# Patient Record
Sex: FEMALE | Race: WHITE
[De-identification: names, ages, dates, MRNs, and addresses within clinical notes are randomized per-mention and may not be internally consistent; named-entity substitution may affect disease eponyms.]

---

## 2017-12-01 NOTE — CR
Right tibia and fibula: AP and lateral views of the right tibia and 

fibula were obtained.

 

No fracture or other abnormality is seen.

 

Impression:

1.  No abnormality is identified on two-view right tibia and fibula 

study.

 

Diagnostic code #1
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
## 2017-12-01 NOTE — CR
Right hand:  Four views of the right hand were obtained.

 

Comparison: No prior hand exam.

 

Joint spaces are preserved.  No fracture, dislocation or other bony 

abnormality is seen.

 

Impression:

1.  No abnormality is identified on right hand exam.

 

Diagnostic code #1

## 2019-08-26 ENCOUNTER — HOSPITAL ENCOUNTER (EMERGENCY)
Dept: HOSPITAL 41 - JD.ED | Age: 29
Discharge: HOME | End: 2019-08-26
Payer: COMMERCIAL

## 2019-08-26 DIAGNOSIS — Z88.5: ICD-10-CM

## 2019-08-26 DIAGNOSIS — E03.9: ICD-10-CM

## 2019-08-26 DIAGNOSIS — M79.89: Primary | ICD-10-CM

## 2019-08-26 DIAGNOSIS — E11.9: ICD-10-CM

## 2019-08-26 DIAGNOSIS — Z88.8: ICD-10-CM

## 2019-08-26 DIAGNOSIS — E66.01: ICD-10-CM

## 2019-08-26 DIAGNOSIS — J45.909: ICD-10-CM

## 2019-08-26 DIAGNOSIS — Z79.899: ICD-10-CM

## 2019-08-26 DIAGNOSIS — Z79.51: ICD-10-CM

## 2019-08-26 DIAGNOSIS — M79.662: ICD-10-CM

## 2019-08-26 NOTE — US
Left lower extremity deep venous ultrasound: Duplex and color Doppler 

imaging was obtained of the left common femoral, proximal greater 

saphenous, superficial femoral, popliteal, posterior tibial and 

peroneal veins.  Right common femoral vein was also evaluated.

 

Comparison: No prior venous imaging.

 

Findings: Normal phasic flow, augmentation and compression are seen 

within the left lower extremity and within the right common femoral 

vein.

 

Impression:

1.  No evidence of deep venous thrombosis within the left lower 

extremity or within the right common femoral vein.

 

Diagnostic code #1

## 2019-08-26 NOTE — EDM.PDOC
ED HPI GENERAL MEDICAL PROBLEM





- General


Chief Complaint: Lower Extremity Injury/Pain


Stated Complaint: LEG PAIN WITH REDNESS AND SWELLING


Time Seen by Provider: 08/26/19 07:09





- History of Present Illness


INITIAL COMMENTS - FREE TEXT/NARRATIVE: 





29-year-old female presents emergency room with left leg pain redness and 

swelling.





This started 2 days ago in her lower calf and is extended up above her knee 

now. She has a history to prior DVTs. She denies any chest pain breathing 

difficulties or shortness of breath. She developed her first DVT when she was 

in the hospital having a brain tumor removed. Patient is currently treated for 

asthma and this is under good control at this time. Patient's last surgery was 

in 2013. She is currently on Keppra but she does not recall having seizures in 

the past.








  ** Left Leg


Pain Score (Numeric/FACES): 7





- Related Data


 Allergies











Allergy/AdvReac Type Severity Reaction Status Date / Time


 


acetaminophen [From Percocet] AdvReac  Dizziness Verified 12/01/17 06:23


 


oxycodone [From Percocet] AdvReac  Dizziness Verified 12/01/17 06:23











Home Meds: 


 Home Meds





Albuterol Sulfate [Proair Hfa] 8.5 gm IH ASDIRECTED PRN 12/01/17 [History]


Desmopressin Acetate [Ddavp] 5 mcg ANDRADE BID 12/01/17 [History]


Levothyroxine Sodium [Synthroid] 150 mcg PO ACBREAKFAST 12/01/17 [History]


Phentermine HCl 30 mg PO DAILY 12/01/17 [History]


Topiramate [Topiramate ER] 250 cap PO BID 12/01/17 [History]


levETIRAcetam [Keppra] 1,000 mg PO DAILY 12/01/17 [History]


predniSONE [Prednisone] 5 mg PO DAILY 12/01/17 [History]


cephALEXin [Keflex] 500 mg PO Q6H #28 cap 08/26/19 [Rx]











Past Medical History


Cardiovascular History: Reports: Blood Clots/VTE/DVT, Other (See Below)


Other Cardiovascular History: hypotension


Respiratory History: Reports: Asthma


Psychiatric History: Reports: Depression


Endocrine/Metabolic History: Reports: Hypothyroidism, Other (See Below)


Other Endocrine/Metabolic History: Diabetes insipidus, adrenal insufficiency


Oncologic (Cancer) History: Reports: Brain





- Infectious Disease History


Infectious Disease History: Reports: Chicken Pox





- Past Surgical History


Head Surgeries/Procedures: Reports: Shunt


Endocrine Surgical History: Reports: Other (See Below)


Other Endocrine Surgeries/Procedures: shunt in placed from brain.





Social & Family History





- Family History


Family Medical History: Noncontributory





- Tobacco Use


Smoking Status *Q: Never Smoker





- Caffeine Use


Caffeine Use: Reports: None





- Recreational Drug Use


Recreational Drug Use: No





Review of Systems





- Review of Systems


Review Of Systems: See Below


Constitutional: Reports: No Symptoms


Respiratory: Reports: No Symptoms


Cardiovascular: Reports: No Symptoms


GI/Abdominal: Reports: No Symptoms


Genitourinary: Reports: No Symptoms


Musculoskeletal: Reports: No Symptoms


Skin: Reports: Rash (In the affected leg)


Neurological: Reports: No Symptoms


Psychiatric: Reports: No Symptoms





ED EXAM, GENERAL





- Physical Exam


Exam: See Below


Exam Limited By: No Limitations


General Appearance: Alert, No Apparent Distress


Respiratory/Chest: No Respiratory Distress, Lungs Clear, Normal Breath Sounds


GI/Abdominal: Normal Bowel Sounds, Soft, Non-Tender, Other (Patient is morbidly 

obese)


Back Exam: No: CVA Tenderness (L), CVA Tenderness (R)


Extremities: Other (She has increased swelling the left leg she has some 

blotchy redness on the medial aspect starting in the mid calf working up to the 

mid thigh.)


Skin Exam: Warm, Dry, Intact


Lymphatic: No Adenopathy





Course





- Vital Signs


Last Recorded V/S: 


 Last Vital Signs











Temp  36.0 C   08/26/19 06:35


 


Pulse  102 H  08/26/19 06:35


 


Resp  16   08/26/19 06:35


 


BP  138/77   08/26/19 06:35


 


Pulse Ox  100   08/26/19 06:35














- Re-Assessments/Exams


Free Text/Narrative Re-Assessment/Exam: 





08/26/19 09:32


Doppler ultrasound of the venous system in the left leg is negative for DVT. I 

discussed the findings this with the patient patient be started on Keflex use 

warm moist heat to the area every couple hours while awake and will follow up 

with her primary physician in [place] in the next day or 2 for recheck.





Departure





- Departure


Time of Disposition: 09:33


Disposition: Home, Self-Care 01


Clinical Impression: 


 Pain and swelling of left lower extremity








- Discharge Information


Prescriptions: 


cephALEXin [Keflex] 500 mg PO Q6H #28 cap


Referrals: 


Mees,Sara Lynn, MD [Primary Care Provider] - 


Forms:  ED Department Discharge, ED Return to Work/School Form


Additional Instructions: 


Return to the emergency room with any questions problems or worsening symptoms. 





Follow-up with your regular provider in the next day or 2 for recheck. 

Occasionally we can get false-negative ultrasounds and this needs to be 

followed closely. 





You will be started on Keflex which is an antibiotic in case this represents an 

early cellulitis which is an infection involving the skin. 





Use warm moist heat over the area every couple hours while you are awake.

## 2025-05-31 ENCOUNTER — HOSPITAL ENCOUNTER (INPATIENT)
Dept: HOSPITAL 41 - JD.ED | Age: 35
LOS: 7 days | Discharge: HOME | DRG: 49 | End: 2025-06-07
Payer: COMMERCIAL

## 2025-05-31 DIAGNOSIS — Z96.89: ICD-10-CM

## 2025-05-31 DIAGNOSIS — J45.909: ICD-10-CM

## 2025-05-31 DIAGNOSIS — Z88.8: ICD-10-CM

## 2025-05-31 DIAGNOSIS — G92.8: ICD-10-CM

## 2025-05-31 DIAGNOSIS — E89.3: ICD-10-CM

## 2025-05-31 DIAGNOSIS — E87.6: ICD-10-CM

## 2025-05-31 DIAGNOSIS — E27.2: ICD-10-CM

## 2025-05-31 DIAGNOSIS — Z79.52: ICD-10-CM

## 2025-05-31 DIAGNOSIS — Z79.51: ICD-10-CM

## 2025-05-31 DIAGNOSIS — R00.1: ICD-10-CM

## 2025-05-31 DIAGNOSIS — E03.9: ICD-10-CM

## 2025-05-31 DIAGNOSIS — Z79.899: ICD-10-CM

## 2025-05-31 DIAGNOSIS — E87.0: ICD-10-CM

## 2025-05-31 DIAGNOSIS — G00.9: Primary | ICD-10-CM

## 2025-05-31 LAB
ALBUMIN SERPL-MCNC: 3.4 G/DL (ref 3.4–5)
ALBUMIN/GLOB SERPL: 0.9 {RATIO} (ref 1–2)
ALP SERPL-CCNC: 171 U/L (ref 46–116)
ALT SERPL-CCNC: 56 U/L (ref 14–59)
AMM URATE CRY #/AREA URNS HPF: (no result) /HPF
AMORPH SED URNS QL MICRO: (no result) /HPF
AMPHET UR QL SCN: (no result)
AMPHET UR QL SCN: NEGATIVE
ANION GAP SERPL CALC-SCNC: 16.3 MMOL/L (ref 5–15)
APPEARANCE UR: CLEAR
APTT PPP: 25.1 SECONDS (ref 21.7–31.4)
AST SERPL-CCNC: 31 U/L (ref 15–37)
BACTERIA URNS QL MICRO: (no result) /HPF
BARBITURATES UR QL SCN: NEGATIVE
BASOPHILS # BLD AUTO: 0.1 K/MM3 (ref 0–0.2)
BASOPHILS NFR BLD AUTO: 0.9 % (ref 0–1)
BENZODIAZ UR QL SCN: NEGATIVE
BILIRUB SERPL-MCNC: 0.5 MG/DL (ref 0.2–1)
BILIRUB UR STRIP-MCNC: NEGATIVE MG/DL
BROAD CASTS #/AREA URNS LPF: (no result) /HPF (ref 0–5)
BUN SERPL-MCNC: 13 MG/DL (ref 7–18)
BUN/CREAT SERPL: 10 (ref 14–18)
BUPRENORPHINE UR QL: NEGATIVE
CA CARBONATE CRY #/AREA URNS HPF: (no result) /[HPF]
CA PHOS CRY #/AREA URNS HPF: (no result) /[HPF]
CALCIUM SERPL-MCNC: 9.6 MG/DL (ref 8.5–10.1)
CAOX CRY #/AREA URNS HPF: (no result) /[HPF]
CHLORIDE SERPL-SCNC: 112 MEQ/L (ref 98–107)
CO2 SERPL-SCNC: 23 MEQ/L (ref 21–32)
COARSE GRAN CASTS #/AREA URNS LPF: (no result) /HPF (ref 0–5)
COLOR UR: YELLOW
CREAT CL 24H UR+SERPL-VRATE: 43.8 ML/MIN
CREAT SERPL-MCNC: 1.3 MG/DL (ref 0.55–1.02)
CRYSTALS #/AREA URNS HPF: (no result) /HPF
CYSTINE CRY #/AREA URNS HPF: (no result) /[HPF]
EGFRCR SERPLBLD CKD-EPI 2021: 55 ML/MIN (ref 60–?)
EOSINOPHIL # BLD AUTO: 1.1 K/MM3 (ref 0–0.4)
EOSINOPHIL NFR BLD AUTO: 12 % (ref 0–6)
EPI CELLS #/AREA URNS HPF: (no result) /HPF (ref 0–5)
EPITH CASTS URNS QL MICRO: (no result) /HPF (ref 0–5)
ETHANOL BLD-MCNC: 0 GM%
FATTY CASTS #/AREA UR COMP ASSIST: (no result) /HPF (ref 0–5)
FINE GRAN CASTS #/AREA URNS LPF: (no result) /LPF (ref 0–5)
GLOBULIN SER-MCNC: 3.7 GM/DL
GLUCOSE SERPL-MCNC: 81 MG/DL (ref 70–99)
GLUCOSE UR STRIP-MCNC: NEGATIVE MG/DL
HCG SERPL-ACNC: < 1 MIU/ML
HCT VFR BLD AUTO: 49.6 % (ref 37–47)
HGB BLD-MCNC: 15.9 GM/DL (ref 12–16)
HYALINE CASTS #/AREA URNS LPF: (no result) /LPF (ref 0–5)
IMM GRANULOCYTES # BLD: 0.04 K/MM3 (ref 0–0.05)
IMM GRANULOCYTES NFR BLD: 0.4 % (ref 0–0.4)
INR PPP: 1.03
KETONES UR STRIP-MCNC: (no result) MG/DL
LEUCINE CRY #/AREA URNS HPF: (no result) /[HPF]
LEUKOCYTE ESTERASE UR QL STRIP: NEGATIVE
LYMPHOCYTES # BLD AUTO: 1.3 K/MM3 (ref 1–4.8)
LYMPHOCYTES NFR BLD AUTO: 14.6 % (ref 24–44)
MCH RBC QN AUTO: 27.3 PG (ref 28–32)
MCHC RBC AUTO-ENTMCNC: 32.1 G/DL (ref 32–36)
MCHC RBC AUTO-ENTMCNC: 85.1 FL (ref 83–99)
METHADONE UR QL SCN: NEGATIVE
MONOCYTES # BLD AUTO: 0.6 K/MM3 (ref 0–0.8)
MONOCYTES NFR BLD AUTO: 7 % (ref 0–8)
MUCOUS THREADS URNS QL MICRO: (no result) /HPF
NEUTROPHILS # BLD AUTO: 5.9 K/MM3 (ref 1.8–7.7)
NEUTROPHILS NFR BLD AUTO: 65.1 % (ref 41–71)
NITRITE UR QL: NEGATIVE
NRBC BLD AUTO-RTO: 0 % (ref 0–0.02)
NRBC BLD AUTO-RTO: 0 % (ref 0–0.2)
OTHER ELEMENTS URNS MICRO: (no result)
OVAL FAT BODIES #/AREA URNS HPF: (no result) /[HPF] (ref 0–5)
OXYCODONE UR QL SCN: NEGATIVE
PATH REV BLD -IMP: (no result)
PH UR STRIP: 6 [PH] (ref 5–8)
PLATELET # BLD AUTO: 254 K/MM3 (ref 150–400)
PMV BLD AUTO: 9.6 FL (ref 9.4–12.3)
POTASSIUM SERPL-SCNC: 4.3 MEQ/L (ref 3.5–5.1)
PROT SERPL-MCNC: 7.1 G/DL (ref 6.4–8.2)
PROT UR STRIP-MCNC: NEGATIVE MG/DL
PROTHROMBIN TIME: 10.9 SECONDS (ref 9.7–12)
RBC # BLD AUTO: 5.83 M/MM3 (ref 4.1–5.3)
RBC # URNS HPF: (no result) /HPF (ref 0–5)
RBC CASTS #/AREA URNS HPF: (no result) /LPF (ref 0–5)
RBC UR QL: NEGATIVE
RENAL EPI CELLS #/AREA URNS HPF: (no result) /HPF (ref 0–5)
SODIUM SERPL-SCNC: 147 MEQ/L (ref 136–145)
SP GR UR STRIP: 1.01 (ref 1–1.03)
SQUAMOUS #/AREA URNS HPF: (no result) /HPF (ref 0–5)
THC UR QL SCN>20 NG/ML: NEGATIVE
TRI-PHOS CRY #/AREA URNS HPF: (no result) /HPF
TRICHOMONAS UR QL MICRO: (no result)
TROPONIN I SERPL HS-IMP: 15 PG/ML (ref ?–51)
TYROSINE CRYSTALS [#/AREA] IN URINE SEDIMENT BY MICROSCOPY HIGH POWER FIELD: (no result) /[HPF]
URATE CRY #/AREA URNS HPF: (no result) /[HPF]
UROBILINOGEN UR STRIP-ACNC: 0.2 (ref 0.2–1)
WAXY CASTS #/AREA URNS HPF: (no result) /LPF (ref 0–5)
WBC # BLD AUTO: 9.11 K/MM3 (ref 3.9–11.3)
WBC CASTS #/AREA URNS HPF: (no result) /LPF (ref 0–5)
WBC CLUMPS #/AREA URNS HPF: (no result) /HPF
WBC UR QL: (no result) /HPF (ref 0–5)
YEAST BUDDING #/AREA URNS HPF: (no result) /[HPF]
YEAST HYPHAE URNS QL MICRO: (no result)
YEAST URNS QL MICRO: (no result)

## 2025-05-31 PROCEDURE — A9577 INJ MULTIHANCE: HCPCS

## 2025-05-31 PROCEDURE — C1758 CATHETER, URETERAL: HCPCS

## 2025-05-31 RX ADMIN — LORAZEPAM ONE: 2 INJECTION INTRAMUSCULAR; INTRAVENOUS at 18:56

## 2025-05-31 RX ADMIN — LORAZEPAM ONE MG: 2 INJECTION INTRAMUSCULAR; INTRAVENOUS at 18:14

## 2025-05-31 RX ADMIN — LORAZEPAM ONE: 2 INJECTION INTRAMUSCULAR; INTRAVENOUS at 19:13

## 2025-05-31 RX ADMIN — METHYLPREDNISOLONE SODIUM SUCCINATE ONE MG: 125 INJECTION, POWDER, FOR SOLUTION INTRAMUSCULAR; INTRAVENOUS at 19:23

## 2025-05-31 RX ADMIN — OLANZAPINE ONE: 10 INJECTION, POWDER, FOR SOLUTION INTRAMUSCULAR at 19:14

## 2025-05-31 RX ADMIN — OLANZAPINE ONE MG: 10 INJECTION, POWDER, FOR SOLUTION INTRAMUSCULAR at 18:41

## 2025-05-31 RX ADMIN — IOPAMIDOL ONE: 755 INJECTION, SOLUTION INTRAVENOUS at 22:21

## 2025-05-31 RX ADMIN — LORAZEPAM PRN MG: 2 INJECTION INTRAMUSCULAR; INTRAVENOUS at 21:25

## 2025-05-31 RX ADMIN — SODIUM CHLORIDE SCH MLS/HR: 0.9 INJECTION, SOLUTION INTRAVENOUS at 20:02

## 2025-05-31 RX ADMIN — SODIUM CHLORIDE ONE MLS/HR: 0.9 INJECTION, SOLUTION INTRAVENOUS at 17:30

## 2025-05-31 RX ADMIN — Medication PRN ML: at 17:39

## 2025-06-01 LAB
ALBUMIN SERPL-MCNC: 3.4 G/DL (ref 3.4–5)
ALBUMIN/GLOB SERPL: 0.8 {RATIO} (ref 1–2)
ALP SERPL-CCNC: 170 U/L (ref 46–116)
ALT SERPL-CCNC: 54 U/L (ref 14–59)
ANION GAP SERPL CALC-SCNC: 20.1 MMOL/L (ref 5–15)
APPEARANCE CSF: CLEAR
APPEARANCE SPUN CSF: (no result)
AST SERPL-CCNC: 36 U/L (ref 15–37)
BASE EXCESS BLDA CALC-SCNC: -4.4 MMOL/L (ref -2–2)
BASOPHILS # BLD AUTO: 0 K/MM3 (ref 0–0.2)
BASOPHILS NFR BLD AUTO: 0.3 % (ref 0–1)
BILIRUB SERPL-MCNC: 0.8 MG/DL (ref 0.2–1)
BUN SERPL-MCNC: 16 MG/DL (ref 7–18)
BUN/CREAT SERPL: 10.7 (ref 14–18)
CALCIUM SERPL-MCNC: 9.7 MG/DL (ref 8.5–10.1)
CHLORIDE SERPL-SCNC: 122 MEQ/L (ref 98–107)
CO2 SERPL-SCNC: 21 MEQ/L (ref 21–32)
COLOR CSF: COLORLESS
CREAT CL 24H UR+SERPL-VRATE: 37.96 ML/MIN
CREAT SERPL-MCNC: 1.5 MG/DL (ref 0.55–1.02)
CRP SERPL-MCNC: 14.43 MG/DL (ref ?–0.3)
EGFRCR SERPLBLD CKD-EPI 2021: 47 ML/MIN (ref 60–?)
EOSINOPHIL # BLD AUTO: 0 K/MM3 (ref 0–0.4)
EOSINOPHIL # CSF: (no result) 10*3/UL
EOSINOPHIL NFR BLD AUTO: 0 % (ref 0–6)
GLOBULIN SER-MCNC: 4.2 GM/DL
GLUCOSE SERPL-MCNC: 117 MG/DL (ref 70–99)
HCO3 BLDA-SCNC: 17.4 MEQ/L (ref 22–26)
HCT VFR BLD AUTO: 51.2 % (ref 37–47)
HGB BLD-MCNC: 16.6 GM/DL (ref 12–16)
IMM GRANULOCYTES # BLD: 0.04 K/MM3 (ref 0–0.05)
IMM GRANULOCYTES NFR BLD: 0.5 % (ref 0–0.4)
LYMPHOCYTES # BLD AUTO: 1 K/MM3 (ref 1–4.8)
LYMPHOCYTES # CSF: (no result) 10*3/UL (ref 0–8)
LYMPHOCYTES NFR BLD AUTO: 12.9 % (ref 24–44)
Lab: (no result)
MCH RBC QN AUTO: 27.4 PG (ref 28–32)
MCHC RBC AUTO-ENTMCNC: 32.4 G/DL (ref 32–36)
MCHC RBC AUTO-ENTMCNC: 84.5 FL (ref 83–99)
MONOCYTES # BLD AUTO: 0.1 K/MM3 (ref 0–0.8)
MONOCYTES # CSF: (no result) 10*3/UL (ref 0–0)
MONOCYTES NFR BLD AUTO: 0.8 % (ref 0–8)
MONOCYTES NFR FLD MANUAL: 91.6 % (ref 70–90)
MONOCYTES+MACROPHAGES [#/VOLUME] IN CEREBRAL SPINAL FLUID: (no result) 10*3/UL (ref 0–5)
NEUTROPHILS # BLD AUTO: 6.7 K/MM3 (ref 1.8–7.7)
NEUTROPHILS NFR BLD AUTO: 85.5 % (ref 41–71)
NEUTS SEG NFR FLD MANUAL: 8.4 % (ref 2–4)
NRBC BLD AUTO-RTO: 0 % (ref 0–0.02)
NRBC BLD AUTO-RTO: 0 % (ref 0–0.2)
PATH REV BLD -IMP: (no result)
PCO2 BLDA: 25 MMHG (ref 35–45)
PLATELET # BLD AUTO: 291 K/MM3 (ref 150–400)
PMV BLD AUTO: 9.9 FL (ref 9.4–12.3)
PO2 BLDA: 74 MMHG (ref 80–100)
POTASSIUM SERPL-SCNC: 4.1 MEQ/L (ref 3.5–5.1)
PROT SERPL-MCNC: 7.6 G/DL (ref 6.4–8.2)
RBC # BLD AUTO: 6.06 M/MM3 (ref 4.1–5.3)
RBC # CSF: 302 CELLS/UL (ref 0–0)
RESP RATE: 38 /MIN
SAO2 % BLDA: 97.7 % (ref 96–97)
SEGMENTED NEUTROPHILS [#/VOLUME] IN CEREBRAL SPINAL FLUID: (no result) 10*3/UL (ref 0–5)
SODIUM SERPL-SCNC: 159 MEQ/L (ref 136–145)
SPECIMEN VOL CSF: 17.5 ML
T4 FREE SERPL-MCNC: 1.14 NG/DL (ref 0.76–1.46)
TSH SERPL DL<=0.005 MIU/L-ACNC: < 0.007 UIU/ML (ref 0.36–3.74)
TUBE # CSF: 2
VDRL CSF-TITR: (no result) {TITER}
WBC # BLD AUTO: 7.78 K/MM3 (ref 3.9–11.3)
WBC # CSF: 565 CELLS/UL (ref 0–5)

## 2025-06-01 PROCEDURE — 009U3ZX DRAINAGE OF SPINAL CANAL, PERCUTANEOUS APPROACH, DIAGNOSTIC: ICD-10-PCS

## 2025-06-01 RX ADMIN — LIDOCAINE HYDROCHLORIDE ONE ML: 10 INJECTION, SOLUTION INFILTRATION; PERINEURAL at 14:14

## 2025-06-01 RX ADMIN — MOMETASONE FUROATE AND FORMOTEROL FUMARATE DIHYDRATE SCH: 200; 5 AEROSOL RESPIRATORY (INHALATION) at 12:28

## 2025-06-01 RX ADMIN — DEXMEDETOMIDINE HYDROCHLORIDE IN 0.9% SODIUM CHLORIDE SCH MLS/HR: 4 INJECTION INTRAVENOUS at 11:35

## 2025-06-01 RX ADMIN — DEXAMETHASONE SODIUM PHOSPHATE SCH MG: 4 INJECTION, SOLUTION INTRAMUSCULAR; INTRAVENOUS at 15:34

## 2025-06-01 RX ADMIN — SODIUM CHLORIDE ONE MLS/HR: 9 INJECTION, SOLUTION INTRAVENOUS at 01:17

## 2025-06-01 RX ADMIN — SODIUM CHLORIDE SCH MLS/HR: 9 INJECTION, SOLUTION INTRAVENOUS at 15:36

## 2025-06-01 RX ADMIN — MIDAZOLAM HYDROCHLORIDE ONE MG: 1 INJECTION, SOLUTION INTRAMUSCULAR; INTRAVENOUS at 12:36

## 2025-06-01 RX ADMIN — VANCOMYCIN SCH MLS/HR: 1.75 INJECTION, SOLUTION INTRAVENOUS at 23:41

## 2025-06-01 RX ADMIN — SODIUM CHLORIDE SCH GM: 9 INJECTION, SOLUTION INTRAVENOUS at 14:10

## 2025-06-01 RX ADMIN — SODIUM CHLORIDE SCH MLS/HR: 0.45 INJECTION, SOLUTION INTRAVENOUS at 01:21

## 2025-06-01 RX ADMIN — SODIUM CHLORIDE SCH MLS/HR: 9 INJECTION, SOLUTION INTRAVENOUS at 06:00

## 2025-06-01 RX ADMIN — SODIUM CHLORIDE SCH MLS/HR: 9 INJECTION, SOLUTION INTRAVENOUS at 19:56

## 2025-06-01 RX ADMIN — MIDAZOLAM HYDROCHLORIDE ONE MG: 1 INJECTION, SOLUTION INTRAMUSCULAR; INTRAVENOUS at 11:35

## 2025-06-01 RX ADMIN — VANCOMYCIN ONE MLS/HR: 2 INJECTION, SOLUTION INTRAVENOUS at 14:10

## 2025-06-01 RX ADMIN — DEXTROSE SCH MLS/HR: 5 SOLUTION INTRAVENOUS at 11:58

## 2025-06-01 RX ADMIN — MIDAZOLAM HYDROCHLORIDE ONE: 1 INJECTION, SOLUTION INTRAMUSCULAR; INTRAVENOUS at 14:04

## 2025-06-01 RX ADMIN — DESMOPRESSIN ACETATE SCH MLS/HR: 4 INJECTION INTRAVENOUS; SUBCUTANEOUS at 15:33

## 2025-06-01 RX ADMIN — IBUPROFEN PRN MG: 400 TABLET ORAL at 00:09

## 2025-06-02 LAB
AMM URATE CRY #/AREA URNS HPF: (no result) /HPF
AMORPH SED URNS QL MICRO: (no result) /HPF
ANION GAP SERPL CALC-SCNC: 16.8 MMOL/L (ref 5–15)
APPEARANCE UR: (no result)
BACTERIA URNS QL MICRO: (no result) /HPF
BASOPHILS # BLD AUTO: 0 K/MM3 (ref 0–0.2)
BASOPHILS NFR BLD AUTO: 0.1 % (ref 0–1)
BILIRUB UR STRIP-MCNC: NEGATIVE MG/DL
BROAD CASTS #/AREA URNS LPF: (no result) /HPF (ref 0–5)
BUN SERPL-MCNC: 16 MG/DL (ref 7–18)
BUN/CREAT SERPL: 12.3 (ref 14–18)
CA CARBONATE CRY #/AREA URNS HPF: (no result) /[HPF]
CA PHOS CRY #/AREA URNS HPF: (no result) /[HPF]
CALCIUM SERPL-MCNC: 8.5 MG/DL (ref 8.5–10.1)
CAOX CRY #/AREA URNS HPF: (no result) /[HPF]
CHLORIDE SERPL-SCNC: 110 MEQ/L (ref 98–107)
CO2 SERPL-SCNC: 20 MEQ/L (ref 21–32)
COARSE GRAN CASTS #/AREA URNS LPF: (no result) /HPF (ref 0–5)
COLOR UR: (no result)
CREAT CL 24H UR+SERPL-VRATE: 43.8 ML/MIN
CREAT SERPL-MCNC: 1.3 MG/DL (ref 0.55–1.02)
CRYSTALS #/AREA URNS HPF: (no result) /HPF
CYSTINE CRY #/AREA URNS HPF: (no result) /[HPF]
EGFRCR SERPLBLD CKD-EPI 2021: 55 ML/MIN (ref 60–?)
EOSINOPHIL # BLD AUTO: 0 K/MM3 (ref 0–0.4)
EOSINOPHIL NFR BLD AUTO: 0.1 % (ref 0–6)
EPI CELLS #/AREA URNS HPF: (no result) /HPF (ref 0–5)
EPITH CASTS URNS QL MICRO: (no result) /HPF (ref 0–5)
FATTY CASTS #/AREA UR COMP ASSIST: (no result) /HPF (ref 0–5)
FINE GRAN CASTS #/AREA URNS LPF: (no result) /LPF (ref 0–5)
GLUCOSE SERPL-MCNC: 286 MG/DL (ref 70–99)
GLUCOSE UR STRIP-MCNC: NEGATIVE MG/DL
HCT VFR BLD AUTO: 44.3 % (ref 37–47)
HGB BLD-MCNC: 14.4 GM/DL (ref 12–16)
HYALINE CASTS #/AREA URNS LPF: (no result) /LPF (ref 0–5)
IMM GRANULOCYTES # BLD: 0.04 K/MM3 (ref 0–0.05)
IMM GRANULOCYTES NFR BLD: 0.3 % (ref 0–0.4)
KETONES UR STRIP-MCNC: NEGATIVE MG/DL
LEUCINE CRY #/AREA URNS HPF: (no result) /[HPF]
LEUKOCYTE ESTERASE UR QL STRIP: NEGATIVE
LYMPHOCYTES # BLD AUTO: 1.3 K/MM3 (ref 1–4.8)
LYMPHOCYTES NFR BLD AUTO: 9.9 % (ref 24–44)
MAGNESIUM SERPL-MCNC: 2 MG/DL (ref 1.8–2.4)
MCH RBC QN AUTO: 27.7 PG (ref 28–32)
MCHC RBC AUTO-ENTMCNC: 32.5 G/DL (ref 32–36)
MCHC RBC AUTO-ENTMCNC: 85.2 FL (ref 83–99)
MONOCYTES # BLD AUTO: 0.3 K/MM3 (ref 0–0.8)
MONOCYTES NFR BLD AUTO: 2.4 % (ref 0–8)
MUCOUS THREADS URNS QL MICRO: (no result) /HPF
NEUTROPHILS # BLD AUTO: 11.8 K/MM3 (ref 1.8–7.7)
NEUTROPHILS NFR BLD AUTO: 87.2 % (ref 41–71)
NITRITE UR QL: NEGATIVE
NRBC BLD AUTO-RTO: 0 % (ref 0–0.02)
NRBC BLD AUTO-RTO: 0 % (ref 0–0.2)
OTHER ELEMENTS URNS MICRO: (no result)
OVAL FAT BODIES #/AREA URNS HPF: (no result) /[HPF] (ref 0–5)
PATH REV BLD -IMP: (no result)
PH UR STRIP: 6 [PH] (ref 5–8)
PHOSPHATE SERPL-MCNC: 2.5 MG/DL (ref 2.6–4.7)
PLATELET # BLD AUTO: 242 K/MM3 (ref 150–400)
PMV BLD AUTO: 10.2 FL (ref 9.4–12.3)
POTASSIUM SERPL-SCNC: 3.8 MEQ/L (ref 3.5–5.1)
PROT UR STRIP-MCNC: (no result) MG/DL
RBC # BLD AUTO: 5.2 M/MM3 (ref 4.1–5.3)
RBC # URNS HPF: (no result) /HPF (ref 0–5)
RBC CASTS #/AREA URNS HPF: (no result) /LPF (ref 0–5)
RBC UR QL: (no result)
RENAL EPI CELLS #/AREA URNS HPF: (no result) /HPF (ref 0–5)
SODIUM SERPL-SCNC: 143 MEQ/L (ref 136–145)
SP GR UR STRIP: (no result) (ref 1–1.03)
SQUAMOUS #/AREA URNS HPF: (no result) /HPF (ref 0–5)
TRI-PHOS CRY #/AREA URNS HPF: (no result) /HPF
TRICHOMONAS UR QL MICRO: (no result)
TYROSINE CRYSTALS [#/AREA] IN URINE SEDIMENT BY MICROSCOPY HIGH POWER FIELD: (no result) /[HPF]
URATE CRY #/AREA URNS HPF: (no result) /[HPF]
UROBILINOGEN UR STRIP-ACNC: 0.2 (ref 0.2–1)
WAXY CASTS #/AREA URNS HPF: (no result) /LPF (ref 0–5)
WBC # BLD AUTO: 13.58 K/MM3 (ref 3.9–11.3)
WBC CASTS #/AREA URNS HPF: (no result) /LPF (ref 0–5)
WBC CLUMPS #/AREA URNS HPF: (no result) /HPF
WBC UR QL: (no result) /HPF (ref 0–5)
YEAST BUDDING #/AREA URNS HPF: (no result) /[HPF]
YEAST HYPHAE URNS QL MICRO: (no result)
YEAST URNS QL MICRO: (no result)

## 2025-06-02 RX ADMIN — SODIUM CHLORIDE SCH MG: 9 INJECTION, SOLUTION INTRAVENOUS at 15:36

## 2025-06-02 RX ADMIN — INSULIN LISPRO SCH: 100 INJECTION, SOLUTION INTRAVENOUS; SUBCUTANEOUS at 08:19

## 2025-06-02 RX ADMIN — ENOXAPARIN SODIUM SCH MG: 40 INJECTION SUBCUTANEOUS at 12:48

## 2025-06-02 RX ADMIN — Medication ONE ML: at 12:26

## 2025-06-02 RX ADMIN — DEXTROSE AND SODIUM CHLORIDE SCH MLS/HR: 5; .45 INJECTION, SOLUTION INTRAVENOUS at 13:21

## 2025-06-02 RX ADMIN — DESMOPRESSIN ACETATE SCH MLS/HR: 4 INJECTION INTRAVENOUS; SUBCUTANEOUS at 15:38

## 2025-06-02 RX ADMIN — GADOBENATE DIMEGLUMINE ONE ML: 529 INJECTION, SOLUTION INTRAVENOUS at 12:26

## 2025-06-02 RX ADMIN — SODIUM CHLORIDE ONE: 9 INJECTION, SOLUTION INTRAVENOUS at 10:13

## 2025-06-03 LAB
ALBUMIN SERPL-MCNC: 2.9 G/DL (ref 3.4–5)
ALBUMIN/GLOB SERPL: 0.9 {RATIO} (ref 1–2)
ALP SERPL-CCNC: 112 U/L (ref 46–116)
ALT SERPL-CCNC: 50 U/L (ref 14–59)
ANION GAP SERPL CALC-SCNC: 19.3 MMOL/L (ref 5–15)
ANION GAP SERPL CALC-SCNC: 21.3 MMOL/L (ref 5–15)
AST SERPL-CCNC: 29 U/L (ref 15–37)
BASOPHILS # BLD AUTO: 0 K/MM3 (ref 0–0.2)
BASOPHILS NFR BLD AUTO: 0.1 % (ref 0–1)
BILIRUB SERPL-MCNC: 0.3 MG/DL (ref 0.2–1)
BUN SERPL-MCNC: 34 MG/DL (ref 7–18)
BUN SERPL-MCNC: 38 MG/DL (ref 7–18)
BUN/CREAT SERPL: 14.6 (ref 14–18)
BUN/CREAT SERPL: 14.8 (ref 14–18)
CALCIUM SERPL-MCNC: 8.5 MG/DL (ref 8.5–10.1)
CALCIUM SERPL-MCNC: 8.8 MG/DL (ref 8.5–10.1)
CHLORIDE SERPL-SCNC: 112 MEQ/L (ref 98–107)
CHLORIDE SERPL-SCNC: 114 MEQ/L (ref 98–107)
CO2 SERPL-SCNC: 16 MEQ/L (ref 21–32)
CO2 SERPL-SCNC: 17 MEQ/L (ref 21–32)
CREAT CL 24H UR+SERPL-VRATE: 21.9 ML/MIN
CREAT CL 24H UR+SERPL-VRATE: 24.76 ML/MIN
CREAT SERPL-MCNC: 2.3 MG/DL (ref 0.55–1.02)
CREAT SERPL-MCNC: 2.6 MG/DL (ref 0.55–1.02)
CRP SERPL-MCNC: 2.32 MG/DL (ref ?–0.3)
EGFRCR SERPLBLD CKD-EPI 2021: 24 ML/MIN (ref 60–?)
EGFRCR SERPLBLD CKD-EPI 2021: 28 ML/MIN (ref 60–?)
EOSINOPHIL # BLD AUTO: 0 K/MM3 (ref 0–0.4)
EOSINOPHIL NFR BLD AUTO: 0.1 % (ref 0–6)
GLOBULIN SER-MCNC: 3.3 GM/DL
GLUCOSE SERPL-MCNC: 161 MG/DL (ref 70–99)
GLUCOSE SERPL-MCNC: 161 MG/DL (ref 70–99)
HCT VFR BLD AUTO: 40.6 % (ref 37–47)
HGB BLD-MCNC: 13.6 GM/DL (ref 12–16)
IMM GRANULOCYTES # BLD: 0.1 K/MM3 (ref 0–0.05)
IMM GRANULOCYTES NFR BLD: 0.5 % (ref 0–0.4)
LYMPHOCYTES # BLD AUTO: 1.2 K/MM3 (ref 1–4.8)
LYMPHOCYTES NFR BLD AUTO: 5.8 % (ref 24–44)
MCH RBC QN AUTO: 27.4 PG (ref 28–32)
MCHC RBC AUTO-ENTMCNC: 33.5 G/DL (ref 32–36)
MCHC RBC AUTO-ENTMCNC: 81.9 FL (ref 83–99)
MONOCYTES # BLD AUTO: 0.7 K/MM3 (ref 0–0.8)
MONOCYTES NFR BLD AUTO: 3.5 % (ref 0–8)
NEUTROPHILS # BLD AUTO: 17.9 K/MM3 (ref 1.8–7.7)
NEUTROPHILS NFR BLD AUTO: 90 % (ref 41–71)
NRBC BLD AUTO-RTO: 0 % (ref 0–0.02)
NRBC BLD AUTO-RTO: 0 % (ref 0–0.2)
PATH REV BLD -IMP: (no result)
PLATELET # BLD AUTO: 257 K/MM3 (ref 150–400)
PMV BLD AUTO: 10.2 FL (ref 9.4–12.3)
POTASSIUM SERPL-SCNC: 3.3 MEQ/L (ref 3.5–5.1)
POTASSIUM SERPL-SCNC: 3.3 MEQ/L (ref 3.5–5.1)
PROT SERPL-MCNC: 6.2 G/DL (ref 6.4–8.2)
RBC # BLD AUTO: 4.96 M/MM3 (ref 4.1–5.3)
SODIUM SERPL-SCNC: 146 MEQ/L (ref 136–145)
SODIUM SERPL-SCNC: 147 MEQ/L (ref 136–145)
VDRL CSF QL: NON REACTIVE
WBC # BLD AUTO: 19.84 K/MM3 (ref 3.9–11.3)

## 2025-06-03 RX ADMIN — POTASSIUM CHLORIDE SCH MLS/HR: 10 INJECTION, SOLUTION INTRAVENOUS at 06:14

## 2025-06-03 RX ADMIN — DEXTROSE SCH MLS/HR: 5 SOLUTION INTRAVENOUS at 17:47

## 2025-06-03 RX ADMIN — DESMOPRESSIN ACETATE SCH SPRAY: 0.1 SOLUTION NASAL at 14:10

## 2025-06-03 RX ADMIN — DEXAMETHASONE SODIUM PHOSPHATE SCH MG: 4 INJECTION, SOLUTION INTRAMUSCULAR; INTRAVENOUS at 15:34

## 2025-06-04 LAB
ACTH PLAS-MCNC: <1.5 PG/ML (ref 7.2–63.3)
ALB CSF/SERPL: (no result) RATIO (ref 0–9)
ALBUMIN CSF-MCNC: >333 MG/DL (ref 0–35)
ALBUMIN SERPL-MCNC: 2.9 G/DL (ref 3.4–5)
ALBUMIN SERPL-MCNC: 3261 MG/DL (ref 3500–5200)
ALBUMIN/GLOB SERPL: 0.9 {RATIO} (ref 1–2)
ALP SERPL-CCNC: 105 U/L (ref 46–116)
ALT SERPL-CCNC: 104 U/L (ref 14–59)
ANA PAT SER IF-IMP: (no result)
ANA PAT SER IF-IMP: (no result)
ANION GAP SERPL CALC-SCNC: 21.3 MMOL/L (ref 5–15)
AST SERPL-CCNC: 62 U/L (ref 15–37)
BASOPHILS # BLD AUTO: 0 K/MM3 (ref 0–0.2)
BASOPHILS NFR BLD AUTO: 0.1 % (ref 0–1)
BILIRUB SERPL-MCNC: 0.3 MG/DL (ref 0.2–1)
BUN SERPL-MCNC: 39 MG/DL (ref 7–18)
BUN/CREAT SERPL: 15 (ref 14–18)
CALCIUM SERPL-MCNC: 8.3 MG/DL (ref 8.5–10.1)
CHLORIDE SERPL-SCNC: 111 MEQ/L (ref 98–107)
CO2 SERPL-SCNC: 16 MEQ/L (ref 21–32)
CREAT CL 24H UR+SERPL-VRATE: 21.9 ML/MIN
CREAT SERPL-MCNC: 2.6 MG/DL (ref 0.55–1.02)
CRP SERPL-MCNC: 1.38 MG/DL (ref ?–0.3)
CYTOPLASMIC AB PATTERN SER IF-IMP: (no result)
CYTOPLASMIC AB TITR SER IF: (no result) {TITER}
DSDNA IGG SER QL IA: (no result)
DSDNA IGG TITR SER CLIF: (no result) {TITER}
EGFRCR SERPLBLD CKD-EPI 2021: 24 ML/MIN (ref 60–?)
ENA JO1 AB TITR SER: (no result) {TITER}
ENA SCL70 IGG SER IA-ACNC: (no result)
ENA SM IGG SER-ACNC: (no result)
ENA SS-A 60KD AB SER-ACNC: (no result)
ENA SS-A IGG SER QL: (no result)
ENA SS-B IGG SER IA-ACNC: (no result)
EOSINOPHIL # BLD AUTO: 0 K/MM3 (ref 0–0.4)
EOSINOPHIL NFR BLD AUTO: 0.1 % (ref 0–6)
GLOBULIN SER-MCNC: 3.3 GM/DL
GLUCOSE SERPL-MCNC: 186 MG/DL (ref 70–99)
HCT VFR BLD AUTO: 39.2 % (ref 37–47)
HGB BLD-MCNC: 13 GM/DL (ref 12–16)
HSV1 DNA CSF QL NAA+PROBE: NOT DETECTED
HSV2 DNA CSF QL NAA+PROBE: NOT DETECTED
IGG CSF-MCNC: 105 MG/DL (ref 0–6)
IGG SERPL-MCNC: 770 MG/DL (ref 768–1632)
IGG SYNTH RATE SER+CSF CALC-MRATE: (no result) MG/D (ref ?–8)
IGG/ALB CLEAR SER+CSF-RTO: (no result) RATIO (ref 0.28–0.66)
IGG/ALB CSF: (no result) RATIO (ref 0.09–0.25)
IMM GRANULOCYTES # BLD: 0.08 K/MM3 (ref 0–0.05)
IMM GRANULOCYTES NFR BLD: 0.6 % (ref 0–0.4)
LYMPHOCYTES # BLD AUTO: 0.9 K/MM3 (ref 1–4.8)
LYMPHOCYTES NFR BLD AUTO: 7.1 % (ref 24–44)
MCH RBC QN AUTO: 27.1 PG (ref 28–32)
MCHC RBC AUTO-ENTMCNC: 33.2 G/DL (ref 32–36)
MCHC RBC AUTO-ENTMCNC: 81.7 FL (ref 83–99)
MONOCYTES # BLD AUTO: 0.4 K/MM3 (ref 0–0.8)
MONOCYTES NFR BLD AUTO: 3.3 % (ref 0–8)
NEUTROPHILS # BLD AUTO: 11.7 K/MM3 (ref 1.8–7.7)
NEUTROPHILS NFR BLD AUTO: 88.8 % (ref 41–71)
NRBC BLD AUTO-RTO: 0 % (ref 0–0.02)
NRBC BLD AUTO-RTO: 0 % (ref 0–0.2)
NUCLEAR IGG TITR SER IF: (no result) {TITER}
NUCLEAR IGG TITR SER IF: (no result) {TITER}
OLIGOCLONAL BANDS CSF ELPH-IMP: (no result)
OLIGOCLONAL BANDS CSF ELPH-IMP: NEGATIVE
OLIGOCLONAL BANDS CSF IEF: 0 BANDS (ref 0–1)
PATH REV BLD -IMP: (no result)
PLATELET # BLD AUTO: 222 K/MM3 (ref 150–400)
PMV BLD AUTO: 10.4 FL (ref 9.4–12.3)
POTASSIUM SERPL-SCNC: 3.3 MEQ/L (ref 3.5–5.1)
PROT SERPL-MCNC: 6.2 G/DL (ref 6.4–8.2)
RBC # BLD AUTO: 4.8 M/MM3 (ref 4.1–5.3)
SODIUM SERPL-SCNC: 145 MEQ/L (ref 136–145)
SPECIMEN SOURCE: (no result)
U1 SNRNP IGG SER-ACNC: (no result)
WBC # BLD AUTO: 13.15 K/MM3 (ref 3.9–11.3)
WNV IGM CSF-ACNC: 0.04 IV (ref ?–0.89)

## 2025-06-04 RX ADMIN — LEVOTHYROXINE SODIUM SCH MCG: 125 TABLET ORAL at 10:29

## 2025-06-04 RX ADMIN — DEXAMETHASONE SODIUM PHOSPHATE SCH MG: 4 INJECTION, SOLUTION INTRAMUSCULAR; INTRAVENOUS at 15:56

## 2025-06-05 LAB
ALBUMIN SERPL-MCNC: 2.9 G/DL (ref 3.4–5)
ALBUMIN/GLOB SERPL: 1 {RATIO} (ref 1–2)
ALP SERPL-CCNC: 98 U/L (ref 46–116)
ALT SERPL-CCNC: 185 U/L (ref 14–59)
ANION GAP SERPL CALC-SCNC: 20.1 MMOL/L (ref 5–15)
AST SERPL-CCNC: 64 U/L (ref 15–37)
BASOPHILS # BLD AUTO: 0 K/MM3 (ref 0–0.2)
BASOPHILS NFR BLD AUTO: 0 % (ref 0–1)
BILIRUB SERPL-MCNC: 0.4 MG/DL (ref 0.2–1)
BUN SERPL-MCNC: 32 MG/DL (ref 7–18)
BUN/CREAT SERPL: 17.8 (ref 14–18)
CALCIUM SERPL-MCNC: 8.7 MG/DL (ref 8.5–10.1)
CHLORIDE SERPL-SCNC: 105 MEQ/L (ref 98–107)
CO2 SERPL-SCNC: 19 MEQ/L (ref 21–32)
CREAT CL 24H UR+SERPL-VRATE: 31.63 ML/MIN
CREAT SERPL-MCNC: 1.8 MG/DL (ref 0.55–1.02)
CRP SERPL-MCNC: 0.66 MG/DL (ref ?–0.3)
EGFRCR SERPLBLD CKD-EPI 2021: 37 ML/MIN (ref 60–?)
EOSINOPHIL # BLD AUTO: 0 K/MM3 (ref 0–0.4)
EOSINOPHIL NFR BLD AUTO: 0 % (ref 0–6)
GLOBULIN SER-MCNC: 3 GM/DL
GLUCOSE SERPL-MCNC: 194 MG/DL (ref 70–99)
HCT VFR BLD AUTO: 38.1 % (ref 37–47)
HGB BLD-MCNC: 12.7 GM/DL (ref 12–16)
IMM GRANULOCYTES # BLD: 0.07 K/MM3 (ref 0–0.05)
IMM GRANULOCYTES NFR BLD: 0.7 % (ref 0–0.4)
LYMPHOCYTES # BLD AUTO: 1.1 K/MM3 (ref 1–4.8)
LYMPHOCYTES NFR BLD AUTO: 10.9 % (ref 24–44)
MCH RBC QN AUTO: 27.2 PG (ref 28–32)
MCHC RBC AUTO-ENTMCNC: 33.3 G/DL (ref 32–36)
MCHC RBC AUTO-ENTMCNC: 81.6 FL (ref 83–99)
MONOCYTES # BLD AUTO: 0.6 K/MM3 (ref 0–0.8)
MONOCYTES NFR BLD AUTO: 6.1 % (ref 0–8)
NEUTROPHILS # BLD AUTO: 8 K/MM3 (ref 1.8–7.7)
NEUTROPHILS NFR BLD AUTO: 82.3 % (ref 41–71)
NRBC BLD AUTO-RTO: 0.02 % (ref 0–0.02)
NRBC BLD AUTO-RTO: 0.2 % (ref 0–0.2)
PATH REV BLD -IMP: (no result)
PLATELET # BLD AUTO: 211 K/MM3 (ref 150–400)
PMV BLD AUTO: 10.3 FL (ref 9.4–12.3)
POTASSIUM SERPL-SCNC: 3.1 MEQ/L (ref 3.5–5.1)
PROT SERPL-MCNC: 5.9 G/DL (ref 6.4–8.2)
RBC # BLD AUTO: 4.67 M/MM3 (ref 4.1–5.3)
SODIUM SERPL-SCNC: 141 MEQ/L (ref 136–145)
WBC # BLD AUTO: 9.7 K/MM3 (ref 3.9–11.3)

## 2025-06-05 RX ADMIN — SODIUM CHLORIDE SCH MLS/HR: 0.9 INJECTION, SOLUTION INTRAVENOUS at 10:42

## 2025-06-05 RX ADMIN — POTASSIUM CHLORIDE ONE MEQ: 1500 TABLET, EXTENDED RELEASE ORAL at 08:56

## 2025-06-05 RX ADMIN — CITALOPRAM HYDROBROMIDE SCH MG: 20 TABLET ORAL at 16:38

## 2025-06-05 RX ADMIN — PREDNISONE SCH MG: 5 TABLET ORAL at 08:55

## 2025-06-06 LAB
ALBUMIN SERPL-MCNC: 2.8 G/DL (ref 3.4–5)
ALBUMIN/GLOB SERPL: 1 {RATIO} (ref 1–2)
ALP SERPL-CCNC: 91 U/L (ref 46–116)
ALT SERPL-CCNC: 182 U/L (ref 14–59)
ANION GAP SERPL CALC-SCNC: 16.1 MMOL/L (ref 5–15)
AST SERPL-CCNC: 41 U/L (ref 15–37)
BILIRUB SERPL-MCNC: 0.3 MG/DL (ref 0.2–1)
BUN SERPL-MCNC: 28 MG/DL (ref 7–18)
BUN/CREAT SERPL: 21.5 (ref 14–18)
CALCIUM SERPL-MCNC: 8.6 MG/DL (ref 8.5–10.1)
CHLORIDE SERPL-SCNC: 108 MEQ/L (ref 98–107)
CO2 SERPL-SCNC: 21 MEQ/L (ref 21–32)
CREAT CL 24H UR+SERPL-VRATE: 43.8 ML/MIN
CREAT SERPL-MCNC: 1.3 MG/DL (ref 0.55–1.02)
EGFRCR SERPLBLD CKD-EPI 2021: 55 ML/MIN (ref 60–?)
GLOBULIN SER-MCNC: 2.8 GM/DL
GLUCOSE SERPL-MCNC: 93 MG/DL (ref 70–99)
Lab: DETECTED
MAGNESIUM SERPL-MCNC: 2.1 MG/DL (ref 1.8–2.4)
PHOSPHATE SERPL-MCNC: 3.2 MG/DL (ref 2.6–4.7)
POTASSIUM SERPL-SCNC: 3.1 MEQ/L (ref 3.5–5.1)
PROT SERPL-MCNC: 5.6 G/DL (ref 6.4–8.2)
SODIUM SERPL-SCNC: 142 MEQ/L (ref 136–145)

## 2025-06-06 RX ADMIN — LEVETIRACETAM SCH MG: 500 TABLET, FILM COATED ORAL at 07:59

## 2025-06-06 RX ADMIN — DEXTROSE PRN ML: 10 SOLUTION INTRAVENOUS at 22:54

## 2025-06-06 RX ADMIN — POTASSIUM CHLORIDE ONE MEQ: 1500 TABLET, EXTENDED RELEASE ORAL at 11:41

## 2025-06-06 RX ADMIN — POTASSIUM CHLORIDE ONE MEQ: 1500 TABLET, EXTENDED RELEASE ORAL at 09:19

## 2025-06-07 LAB
ALBUMIN SERPL-MCNC: 2.9 G/DL (ref 3.4–5)
ALBUMIN/GLOB SERPL: 1 {RATIO} (ref 1–2)
ALDOST SERPL-MCNC: <3 NG/DL
ALP SERPL-CCNC: 101 U/L (ref 46–116)
ALT SERPL-CCNC: 144 U/L (ref 14–59)
ANION GAP SERPL CALC-SCNC: 15.5 MMOL/L (ref 5–15)
AST SERPL-CCNC: 22 U/L (ref 15–37)
BILIRUB SERPL-MCNC: 0.4 MG/DL (ref 0.2–1)
BUN SERPL-MCNC: 23 MG/DL (ref 7–18)
BUN/CREAT SERPL: 19.2 (ref 14–18)
CALCIUM SERPL-MCNC: 8.8 MG/DL (ref 8.5–10.1)
CHLORIDE SERPL-SCNC: 109 MEQ/L (ref 98–107)
CO2 SERPL-SCNC: 23 MEQ/L (ref 21–32)
CREAT CL 24H UR+SERPL-VRATE: 47.45 ML/MIN
CREAT SERPL-MCNC: 1.2 MG/DL (ref 0.55–1.02)
EGFRCR SERPLBLD CKD-EPI 2021: 61 ML/MIN (ref 60–?)
GLOBULIN SER-MCNC: 2.8 GM/DL
GLUCOSE SERPL-MCNC: 76 MG/DL (ref 70–99)
Lab: (no result)
MAGNESIUM SERPL-MCNC: 2 MG/DL (ref 1.8–2.4)
NUCLEAR IGG SER QL IF: (no result)
POTASSIUM SERPL-SCNC: 3.5 MEQ/L (ref 3.5–5.1)
PROT SERPL-MCNC: 5.7 G/DL (ref 6.4–8.2)
SODIUM SERPL-SCNC: 144 MEQ/L (ref 136–145)

## 2025-06-07 RX ADMIN — POTASSIUM CHLORIDE ONE MEQ: 1500 TABLET, EXTENDED RELEASE ORAL at 08:21

## 2025-06-08 LAB — RENIN PLAS-CCNC: 1.7 NG/ML/HR
